# Patient Record
Sex: FEMALE | Race: WHITE | NOT HISPANIC OR LATINO | Employment: OTHER | URBAN - METROPOLITAN AREA
[De-identification: names, ages, dates, MRNs, and addresses within clinical notes are randomized per-mention and may not be internally consistent; named-entity substitution may affect disease eponyms.]

---

## 2024-04-22 DIAGNOSIS — Z12.31 ENCOUNTER FOR SCREENING MAMMOGRAM FOR MALIGNANT NEOPLASM OF BREAST: Primary | ICD-10-CM

## 2024-04-24 DIAGNOSIS — I87.2 VENOUS INSUFFICIENCY (CHRONIC) (PERIPHERAL): Primary | ICD-10-CM

## 2024-05-27 ENCOUNTER — TELEPHONE (OUTPATIENT)
Dept: VASCULAR SURGERY | Facility: CLINIC | Age: 59
End: 2024-05-27
Payer: MEDICAID

## 2024-05-27 NOTE — TELEPHONE ENCOUNTER
Called pt to schedule VI study and new pt appt. LVM w/direct number to clinic for her to call back.         ----- Message from Ernestine Lagunas RN sent at 5/27/2024  2:40 PM CDT -----  This patient is being referred for lymphedema. Can you please contact her to schedule?  Ernestine  ----- Message -----  From: Ekaterina Diehl  Sent: 5/27/2024   1:46 PM CDT  To: [facility] Clinical Support    Hello,    I have pt being referred for Lymphedema.  I have scanned the referral /records in to media mgr within Epic. Please review and contact for scheduling,thanks!           Ekaterina ZHAO   [facility]

## 2025-08-07 ENCOUNTER — OFFICE VISIT (OUTPATIENT)
Dept: WOUND CARE | Facility: HOSPITAL | Age: 60
End: 2025-08-07
Attending: FAMILY MEDICINE
Payer: MEDICAID

## 2025-08-07 VITALS
HEART RATE: 83 BPM | DIASTOLIC BLOOD PRESSURE: 94 MMHG | SYSTOLIC BLOOD PRESSURE: 173 MMHG | TEMPERATURE: 98 F | RESPIRATION RATE: 18 BRPM

## 2025-08-07 DIAGNOSIS — L97.812 ULCER OF RIGHT PRETIBIAL REGION, WITH FAT LAYER EXPOSED: Primary | ICD-10-CM

## 2025-08-07 DIAGNOSIS — I87.311 IDIOPATHIC CHRONIC VENOUS HYPERTENSION OF RIGHT LOWER EXTREMITY WITH ULCER: ICD-10-CM

## 2025-08-07 DIAGNOSIS — I73.9 PERIPHERAL VASCULAR DISEASE, UNSPECIFIED: ICD-10-CM

## 2025-08-07 DIAGNOSIS — L97.919 IDIOPATHIC CHRONIC VENOUS HYPERTENSION OF RIGHT LOWER EXTREMITY WITH ULCER: ICD-10-CM

## 2025-08-07 PROCEDURE — 11042 DBRDMT SUBQ TIS 1ST 20SQCM/<: CPT | Mod: PN | Performed by: FAMILY MEDICINE

## 2025-08-07 PROCEDURE — 99203 OFFICE O/P NEW LOW 30 MIN: CPT | Mod: PN,25 | Performed by: FAMILY MEDICINE

## 2025-08-07 NOTE — PROGRESS NOTES
Name: Asha Carrillo   Age: 60 y.o.  Sex: female    Chief complaint: No chief complaint on file.       Subjective:   59-year-old female with a past medical history of peripheral vascular disease that [facility] wounds on bilateral lower extremity. Patient was said she has been dealing with wounds in her bilateral lower extremity that has been going on for almost 20 years that wax and wane. Her current wounds on bilateral lower ankles started a around September-October 2024 and have gotten progressively larger since that time.     01/03/2025 U/S Arterial Duplex:  All arteries patent bilaterally with normal drive like waveforms.     1/18/2025 DNA culture:  Psuedomonas     05/26/2025 US Duplex Leg Bilateral:  Negative for deep vein thrombosis in the lower extremities.     Current treatment:  Right lower extremity, lateral ankle wound:  - Cleanse and clean  - Discontinue wound vac  - Medihoney  - Unna boots. The patient understands that by using the Unna boots we will be able to obtain better compression.  There is a risk of the infection burden on the wound increasing.  She was okay with trying the Unna boot and seeing how it looks next week.  Other:  - Decrease salt intake  - Keep legs elevated  - Continue ambulating     Current visit:  Today the patient feels like her wounds have been stable. Mild pain and discharge.          No past medical history on file.  No past surgical history on file.  Social History     Socioeconomic History    Marital status:      Social Drivers of Health     Financial Resource Strain: Low Risk  (8/17/2020)    Received from [facility]    Overall Financial Resource Strain (CARDIA)     Difficulty of Paying Living Expenses: Not very hard   Food Insecurity: No Food Insecurity (8/17/2020)    Received from [facility]    Hunger Vital Sign     Worried About Running Out of Food in the Last Year: Never true     Ran Out of Food in the Last Year: Never true    Transportation Needs: No Transportation Needs (8/17/2020)    Received from [facility]    PRAPARE - Transportation     Lack of Transportation (Medical): No     Lack of Transportation (Non-Medical): No   Physical Activity: Inactive (5/5/2020)    Received from [facility]    Exercise Vital Sign     Days of Exercise per Week: 0 days     Minutes of Exercise per Session: 0 min   Stress: Stress Concern Present (5/5/2020)    Received from [facility]    [employer] - Occupational Stress Questionnaire     Feeling of Stress : To some extent     Review of patient's allergies indicates:  No Known Allergies     Objective:     There were no vitals filed for this visit.     Physical Exam  Constitutional:       Appearance: She is not ill-appearing or toxic-appearing.   HENT:      Head: Normocephalic and atraumatic.      Nose: Nose normal.   Eyes:      General:         Right eye: No discharge.         Left eye: No discharge.      Conjunctiva/sclera: Conjunctivae normal.   Pulmonary:      Effort: Pulmonary effort is normal. No respiratory distress.   Musculoskeletal:         General: No deformity. Normal range of motion.      Cervical back: Normal range of motion.   Skin:     Comments: Please refer to documentation from [facility] for full integumentary exam   Neurological:      General: No focal deficit present.      Mental Status: She is alert. Mental status is at baseline.   Psychiatric:         Mood and Affect: Mood normal.         Behavior: Behavior normal.            Medical decision making:   Patient with a history of peripheral vascular disease that presented for lower extremity venous ulcers. Wounds have been waxing and waning for her for 20+ years. Last follow up with vascular surgery was 10+ years ago, has been wearing Unna boots, and has home health changes weekly. Per cardiology no further workup was needed at this time.  Symptoms related to chronic venous insufficiency.     Right lower  extremity, lateral ankle wound looks much improved. Subcutaneous debridement was performed.    Overall edema is slightly worse today compared to last week.  There were some satellite wounds that had previously been heel that is started to open back up because of the edema.  Subcutaneous debridement was performed.     Right lower extremity, lateral ankle wound:  - Cleanse and clean  - Medihoney  - Unna boots. The patient understands that by using the Unna boots we will be able to obtain better compression.  There is a risk of the infection burden on the wound increasing.  She was okay with trying the Unna boot and seeing how it looks next week.  Other:  - Decrease salt intake  - Keep legs elevated  - Continue ambulating     Follow up in 1 week     Serjio Chiu M.D.  Wound Care Physician     (Please note that this chart was completed via voice to text dictation. There may be typographical errors or substitutions that are unintentional, or uncorrected. Every attempt was made to proofread the chart prior to completion. If there are any questions, please contact the physician for final clarification).

## 2025-08-14 ENCOUNTER — OFFICE VISIT (OUTPATIENT)
Dept: WOUND CARE | Facility: HOSPITAL | Age: 60
End: 2025-08-14
Attending: FAMILY MEDICINE
Payer: MEDICAID

## 2025-08-14 VITALS
HEART RATE: 82 BPM | RESPIRATION RATE: 18 BRPM | DIASTOLIC BLOOD PRESSURE: 81 MMHG | TEMPERATURE: 99 F | SYSTOLIC BLOOD PRESSURE: 146 MMHG

## 2025-08-14 DIAGNOSIS — L97.812 ULCER OF RIGHT PRETIBIAL REGION, WITH FAT LAYER EXPOSED: Primary | ICD-10-CM

## 2025-08-14 DIAGNOSIS — L97.312 NON-PRESSURE CHRONIC ULCER OF RIGHT ANKLE WITH FAT LAYER EXPOSED: ICD-10-CM

## 2025-08-14 PROCEDURE — 11042 DBRDMT SUBQ TIS 1ST 20SQCM/<: CPT | Mod: PN | Performed by: FAMILY MEDICINE

## 2025-08-21 ENCOUNTER — OFFICE VISIT (OUTPATIENT)
Dept: WOUND CARE | Facility: HOSPITAL | Age: 60
End: 2025-08-21
Attending: FAMILY MEDICINE
Payer: MEDICAID

## 2025-08-21 VITALS
RESPIRATION RATE: 18 BRPM | DIASTOLIC BLOOD PRESSURE: 75 MMHG | TEMPERATURE: 99 F | SYSTOLIC BLOOD PRESSURE: 148 MMHG | HEART RATE: 69 BPM

## 2025-08-21 DIAGNOSIS — L97.812 ULCER OF RIGHT PRETIBIAL REGION, WITH FAT LAYER EXPOSED: Primary | ICD-10-CM

## 2025-08-21 DIAGNOSIS — I87.2 CHRONIC VENOUS INSUFFICIENCY: ICD-10-CM

## 2025-08-21 DIAGNOSIS — L97.312 NON-PRESSURE CHRONIC ULCER OF RIGHT ANKLE WITH FAT LAYER EXPOSED: ICD-10-CM

## 2025-08-21 PROCEDURE — 11042 DBRDMT SUBQ TIS 1ST 20SQCM/<: CPT | Mod: PN | Performed by: FAMILY MEDICINE

## 2025-08-28 ENCOUNTER — OFFICE VISIT (OUTPATIENT)
Dept: WOUND CARE | Facility: HOSPITAL | Age: 60
End: 2025-08-28
Attending: FAMILY MEDICINE
Payer: COMMERCIAL

## 2025-08-28 VITALS
DIASTOLIC BLOOD PRESSURE: 96 MMHG | TEMPERATURE: 98 F | HEART RATE: 75 BPM | RESPIRATION RATE: 16 BRPM | SYSTOLIC BLOOD PRESSURE: 154 MMHG

## 2025-08-28 DIAGNOSIS — L97.812 ULCER OF RIGHT PRETIBIAL REGION, WITH FAT LAYER EXPOSED: Primary | ICD-10-CM

## 2025-08-28 PROCEDURE — 11042 DBRDMT SUBQ TIS 1ST 20SQCM/<: CPT | Mod: PN | Performed by: FAMILY MEDICINE

## 2025-09-04 ENCOUNTER — OFFICE VISIT (OUTPATIENT)
Dept: WOUND CARE | Facility: HOSPITAL | Age: 60
End: 2025-09-04
Attending: FAMILY MEDICINE
Payer: COMMERCIAL

## 2025-09-04 VITALS
DIASTOLIC BLOOD PRESSURE: 81 MMHG | TEMPERATURE: 99 F | SYSTOLIC BLOOD PRESSURE: 157 MMHG | RESPIRATION RATE: 18 BRPM | HEART RATE: 68 BPM

## 2025-09-04 DIAGNOSIS — L97.812 ULCER OF RIGHT PRETIBIAL REGION, WITH FAT LAYER EXPOSED: Primary | ICD-10-CM

## 2025-09-04 PROCEDURE — 11042 DBRDMT SUBQ TIS 1ST 20SQCM/<: CPT | Mod: PN | Performed by: FAMILY MEDICINE
